# Patient Record
Sex: FEMALE | Race: WHITE | Employment: OTHER | URBAN - METROPOLITAN AREA
[De-identification: names, ages, dates, MRNs, and addresses within clinical notes are randomized per-mention and may not be internally consistent; named-entity substitution may affect disease eponyms.]

---

## 2022-04-12 ENCOUNTER — HOSPITAL ENCOUNTER (OUTPATIENT)
Dept: CARDIAC REHAB | Age: 72
Setting detail: THERAPIES SERIES
Discharge: HOME | End: 2022-04-12
Payer: MEDICARE

## 2022-04-12 PROCEDURE — 94618 PULMONARY STRESS TESTING: CPT

## 2022-04-12 NOTE — PROCEDURES
[facility] Cardiopulmonary Rehabilitation   Six Minute Walk Test    Unknown Ranjeet Soares  YOB: 1950  Account Number: [de-identified]  AGE: 66 y.o.     OP test []   Pulmonary Rehab PRE [x]  POST   []    Diagnosis: Interstitial lung disease      Referring Physician: Jazmyne    Gender []  male [x] female AGE:71     Height:5 ft 8 in 173 cm    Weight:278 lbs  126 kg  Blood Pressure 120/72    Medications affecting heart rate:  Tenormin 50 mg daily,  Brio inhaler daily      Supplemental O2 during the test [x]  no [] yes    [] continuous []  intermittent    Device : [] NC []  HFNC    []  oximizer  [] mask      Laps completed: 10 (1 lap = 100 ft)        Baseline (resting) Walking End of Test (recovery)      Heart Rate 87   127  88    BP  120/72           144/84           130/72    Dyspnea 0   0.5  0 (Sade scale)    Fatigue 0   2  2 (Sade scale)    SpO2  98%   92%             100%        Stopped or paused before 6 mins? [x]  no [] yes How long? Symptoms at end of exercise:[] angina  [] dizziness  []  hip, leg, calf, back pain       [x]  no complaints []  other    Number of laps 10 (x 30.48 meters) =    Total distance walked in 6 mins: 305 meters    Predicted distance: 305 meters  Percent predicted:94.9%              [x] normal       [] reduced     Summary: This is a pre-rehab test, performed on room air. The patient ambulated 305 meters which is normal-95% predicted. Her  heart rate response was normal, the blood pressure response was normal, oxygen saturations were normal.  The patient desaturated to 92% while ambulating on room air   The degree of symptoms based on the Sade Dyspnea/Fatigue scale were not significantly changed with testing. This test does not indicate the need for supplemental oxygen.           Shayla Snell, DO  Pulmonary Rehab Director
Patient

## 2022-04-13 PROCEDURE — 94618 PULMONARY STRESS TESTING: CPT | Performed by: INTERNAL MEDICINE

## 2022-04-13 NOTE — PROGRESS NOTES
[facility] PULMONARY REHABILITATION ORDER  Medical Director:  Dr. Quinton Duran  (X)Phase II Pulmonary Rehabilitation [facility] Facility-based, supervised exercise with SpO2 / HR monitoring and Oxygen Titration (if necessary) each session, 2-3 times weekly, with individualized education sessions. Patient Name: Sandra Gomez  : 1950  Referring Physician: Dr. Rea Flow  Date: 2022    Medically Necessary Pulmonary Rehab for:  ILD (from my dictation or the PFT report), which is GOLD Stage     Physician Prescribed Exercise:  Length of program:  Up to 36 sessions, subject to insurance limitations. Program to include aerobic endurance conditioning, resistance training (RT), step training (ST), flexibility training, and education (all relevant topics including psychosocial assessment). FITT Principles + Progression for Exercise Prescription (also found on the ITP):     Frequency: 2-3x / wk for up to 36 sessions    Intensity:   Set from Initial 6MWT (Feet achieved converted to METs)   Type:          Aerobic and Strength (Treadmill, AD, NuStep, SciFit, UBE, RT, ST)   Time:        15-60 min. of Treatment; Aerobic, RT, ST, Rests and Education  Progression:  1-2 minute increase in Time, per Type, per session, 5-20% increase in Intensity per week if SpO2 >88 AND Sade RPE/RPD is <14      Note:  These are guidelines. The Pulmonary Rehab staff may adjust the treatment to suit the patient's individual needs, goals, oxygen saturation and functional level. Plan of Care:  Pulmonary Rehab aerobic endurance and strength training sessions for a total of 30-91 min/day, including Education time, 2-3 days/week with suggested supplemented matching minutes of walking at home on most days not participating in Pulmonary Rehab. Patient is willing to cooperate and participate in the plan of care.  Patient is physically able, motivated, and willing to participate in [place], and I expect this patient to improve in a reasonable and predictable time frame. Other Program Components:   (X)6 Minute Walk Test (6 MWT) at program initiation and discharge   (X)Evaluation for oxygen needs while exercising   (X)Titrate Oxygen to maintain >88 SpO2   (X)Stop Exercise or Apply Oxygen if patient desaturates <88%   (x)May continue in the Maintenance Program upon completion    Measurable Endurance Goal:    -Aerobic endurance goal to be measured in minutes. Start endurance training per patient tolerance at 1-15 minutes per exercise type, progressing to a total of 31-60 minutes using various modes of training (see Exercise Prescription on Individualized Treatment Plan 'ITP'). -Measurable Muscular Strength Goal: Starting at 1-3 lbs x 8 reps, progressing daily/weekly to 5-10 lbs x 15 reps    NOTE: Leif Soares's tobacco History:   Social History     Tobacco Use   Smoking Status Former Smoker    Quit date: 1983    Years since quittin.3   Smokeless Tobacco Never Used     If patient is a current smoker, patient will participate in tobacco cessation program during Pulmonary Rehab.       Physician Signature/Date/Time:

## 2022-04-14 ENCOUNTER — HOSPITAL ENCOUNTER (OUTPATIENT)
Dept: CARDIAC REHAB | Age: 72
Setting detail: THERAPIES SERIES
Discharge: HOME | End: 2022-04-14
Payer: MEDICARE

## 2022-04-14 PROCEDURE — G0237 THERAPEUTIC PROCD STRG ENDUR: HCPCS

## 2022-04-14 RX ORDER — ATORVASTATIN CALCIUM 10 MG/1
10 TABLET, FILM COATED ORAL DAILY
COMMUNITY

## 2022-04-14 RX ORDER — DIMENHYDRINATE 50 MG/1
50 TABLET ORAL NIGHTLY PRN
COMMUNITY
End: 2022-06-27

## 2022-04-14 RX ORDER — LOSARTAN POTASSIUM 25 MG/1
25 TABLET ORAL DAILY
COMMUNITY

## 2022-04-14 RX ORDER — FLUTICASONE PROPIONATE 50 MCG
1 SPRAY, SUSPENSION (ML) NASAL DAILY
COMMUNITY
End: 2022-06-27 | Stop reason: ALTCHOICE

## 2022-04-14 RX ORDER — IPRATROPIUM BROMIDE AND ALBUTEROL SULFATE 2.5; .5 MG/3ML; MG/3ML
1 SOLUTION RESPIRATORY (INHALATION) EVERY 6 HOURS PRN
COMMUNITY

## 2022-04-19 ENCOUNTER — HOSPITAL ENCOUNTER (OUTPATIENT)
Dept: CARDIAC REHAB | Age: 72
Setting detail: THERAPIES SERIES
Discharge: HOME | End: 2022-04-19
Payer: MEDICARE

## 2022-04-19 ENCOUNTER — HOSPITAL ENCOUNTER (OUTPATIENT)
Dept: CARDIAC REHAB | Age: 72
Setting detail: THERAPIES SERIES
End: 2022-04-19
Payer: MEDICARE

## 2022-04-19 PROCEDURE — G0239 OTH RESP PROC, GROUP: HCPCS

## 2022-04-21 ENCOUNTER — HOSPITAL ENCOUNTER (OUTPATIENT)
Dept: CARDIAC REHAB | Age: 72
Setting detail: THERAPIES SERIES
Discharge: HOME | End: 2022-04-21
Payer: MEDICARE

## 2022-04-21 ENCOUNTER — APPOINTMENT (OUTPATIENT)
Dept: CARDIAC REHAB | Age: 72
End: 2022-04-21
Payer: MEDICARE

## 2022-04-21 PROCEDURE — G0239 OTH RESP PROC, GROUP: HCPCS

## 2022-04-26 ENCOUNTER — APPOINTMENT (OUTPATIENT)
Dept: CARDIAC REHAB | Age: 72
End: 2022-04-26
Payer: MEDICARE

## 2022-04-28 ENCOUNTER — APPOINTMENT (OUTPATIENT)
Dept: CARDIAC REHAB | Age: 72
End: 2022-04-28
Payer: MEDICARE

## 2022-04-28 LAB
GLUCOSE BLD-MCNC: 120 MG/DL (ref 70–99)
GLUCOSE BLD-MCNC: 127 MG/DL (ref 70–99)
PERFORMED ON: ABNORMAL
PERFORMED ON: ABNORMAL

## 2022-05-03 ENCOUNTER — HOSPITAL ENCOUNTER (OUTPATIENT)
Dept: CARDIAC REHAB | Age: 72
Setting detail: THERAPIES SERIES
Discharge: HOME | End: 2022-05-03
Payer: MEDICARE

## 2022-05-03 LAB
GLUCOSE BLD-MCNC: 112 MG/DL (ref 70–99)
GLUCOSE BLD-MCNC: 115 MG/DL (ref 70–99)
PERFORMED ON: ABNORMAL
PERFORMED ON: ABNORMAL

## 2022-05-03 PROCEDURE — G0239 OTH RESP PROC, GROUP: HCPCS

## 2022-05-05 ENCOUNTER — HOSPITAL ENCOUNTER (OUTPATIENT)
Dept: CARDIAC REHAB | Age: 72
Setting detail: THERAPIES SERIES
Discharge: HOME | End: 2022-05-05
Payer: MEDICARE

## 2022-05-05 PROCEDURE — G0239 OTH RESP PROC, GROUP: HCPCS

## 2022-05-10 ENCOUNTER — HOSPITAL ENCOUNTER (OUTPATIENT)
Dept: CARDIAC REHAB | Age: 72
Setting detail: THERAPIES SERIES
Discharge: HOME | End: 2022-05-10
Payer: MEDICARE

## 2022-05-10 PROCEDURE — G0239 OTH RESP PROC, GROUP: HCPCS

## 2022-05-12 ENCOUNTER — APPOINTMENT (OUTPATIENT)
Dept: CARDIAC REHAB | Age: 72
End: 2022-05-12
Payer: MEDICARE

## 2022-05-17 ENCOUNTER — HOSPITAL ENCOUNTER (OUTPATIENT)
Dept: CARDIAC REHAB | Age: 72
Setting detail: THERAPIES SERIES
Discharge: HOME | End: 2022-05-17
Payer: MEDICARE

## 2022-05-17 PROCEDURE — G0239 OTH RESP PROC, GROUP: HCPCS

## 2022-05-19 ENCOUNTER — HOSPITAL ENCOUNTER (OUTPATIENT)
Dept: CARDIAC REHAB | Age: 72
Setting detail: THERAPIES SERIES
Discharge: HOME | End: 2022-05-19
Payer: MEDICARE

## 2022-05-19 PROCEDURE — G0239 OTH RESP PROC, GROUP: HCPCS

## 2022-05-24 ENCOUNTER — HOSPITAL ENCOUNTER (OUTPATIENT)
Dept: CARDIAC REHAB | Age: 72
Setting detail: THERAPIES SERIES
Discharge: HOME | End: 2022-05-24
Payer: MEDICARE

## 2022-05-24 PROCEDURE — G0239 OTH RESP PROC, GROUP: HCPCS

## 2022-05-26 ENCOUNTER — APPOINTMENT (OUTPATIENT)
Dept: CARDIAC REHAB | Age: 72
End: 2022-05-26
Payer: MEDICARE

## 2022-05-31 ENCOUNTER — HOSPITAL ENCOUNTER (OUTPATIENT)
Dept: CARDIAC REHAB | Age: 72
Setting detail: THERAPIES SERIES
Discharge: HOME | End: 2022-05-31
Payer: MEDICARE

## 2022-05-31 PROCEDURE — G0239 OTH RESP PROC, GROUP: HCPCS

## 2022-06-02 ENCOUNTER — HOSPITAL ENCOUNTER (OUTPATIENT)
Dept: CARDIAC REHAB | Age: 72
Setting detail: THERAPIES SERIES
Discharge: HOME | End: 2022-06-02
Payer: MEDICARE

## 2022-06-02 PROCEDURE — G0239 OTH RESP PROC, GROUP: HCPCS

## 2022-06-07 ENCOUNTER — APPOINTMENT (OUTPATIENT)
Dept: CARDIAC REHAB | Age: 72
End: 2022-06-07
Payer: MEDICARE

## 2022-06-09 ENCOUNTER — APPOINTMENT (OUTPATIENT)
Dept: CARDIAC REHAB | Age: 72
End: 2022-06-09
Payer: MEDICARE

## 2022-06-14 ENCOUNTER — APPOINTMENT (OUTPATIENT)
Dept: CARDIAC REHAB | Age: 72
End: 2022-06-14
Payer: MEDICARE

## 2022-06-16 ENCOUNTER — APPOINTMENT (OUTPATIENT)
Dept: CARDIAC REHAB | Age: 72
End: 2022-06-16
Payer: MEDICARE

## 2022-06-20 ENCOUNTER — TELEPHONE (OUTPATIENT)
Dept: ORTHOPEDIC SURGERY | Age: 72
End: 2022-06-20

## 2022-06-21 ENCOUNTER — APPOINTMENT (OUTPATIENT)
Dept: CARDIAC REHAB | Age: 72
End: 2022-06-21
Payer: MEDICARE

## 2022-06-23 ENCOUNTER — APPOINTMENT (OUTPATIENT)
Dept: CARDIAC REHAB | Age: 72
End: 2022-06-23
Payer: MEDICARE

## 2022-06-27 ENCOUNTER — OFFICE VISIT (OUTPATIENT)
Dept: ORTHOPEDIC SURGERY | Age: 72
End: 2022-06-27
Payer: MEDICARE

## 2022-06-27 VITALS — HEIGHT: 68 IN | WEIGHT: 279 LBS | BODY MASS INDEX: 42.28 KG/M2

## 2022-06-27 DIAGNOSIS — D49.2 NEOPLASM OF RIGHT FEMUR: ICD-10-CM

## 2022-06-27 DIAGNOSIS — M25.561 CHRONIC PAIN OF RIGHT KNEE: Primary | ICD-10-CM

## 2022-06-27 DIAGNOSIS — G89.29 CHRONIC PAIN OF RIGHT KNEE: Primary | ICD-10-CM

## 2022-06-27 PROCEDURE — G8400 PT W/DXA NO RESULTS DOC: HCPCS | Performed by: ORTHOPAEDIC SURGERY

## 2022-06-27 PROCEDURE — G8419 CALC BMI OUT NRM PARAM NOF/U: HCPCS | Performed by: ORTHOPAEDIC SURGERY

## 2022-06-27 PROCEDURE — G8427 DOCREV CUR MEDS BY ELIG CLIN: HCPCS | Performed by: ORTHOPAEDIC SURGERY

## 2022-06-27 PROCEDURE — 1123F ACP DISCUSS/DSCN MKR DOCD: CPT | Performed by: ORTHOPAEDIC SURGERY

## 2022-06-27 PROCEDURE — 3017F COLORECTAL CA SCREEN DOC REV: CPT | Performed by: ORTHOPAEDIC SURGERY

## 2022-06-27 PROCEDURE — 1036F TOBACCO NON-USER: CPT | Performed by: ORTHOPAEDIC SURGERY

## 2022-06-27 PROCEDURE — 1090F PRES/ABSN URINE INCON ASSESS: CPT | Performed by: ORTHOPAEDIC SURGERY

## 2022-06-27 PROCEDURE — 99203 OFFICE O/P NEW LOW 30 MIN: CPT | Performed by: ORTHOPAEDIC SURGERY

## 2022-06-27 RX ORDER — PREDNISONE 20 MG/1
TABLET ORAL
COMMUNITY
Start: 2022-03-22 | End: 2022-06-27 | Stop reason: ALTCHOICE

## 2022-06-27 RX ORDER — METOPROLOL TARTRATE 50 MG/1
50 TABLET, FILM COATED ORAL DAILY
COMMUNITY

## 2022-06-27 RX ORDER — COVID-19 MOLECULAR TEST ASSAY
KIT MISCELLANEOUS
COMMUNITY
Start: 2022-05-25 | End: 2022-06-27

## 2022-06-27 RX ORDER — SULFAMETHOXAZOLE AND TRIMETHOPRIM 800; 160 MG/1; MG/1
TABLET ORAL
COMMUNITY
Start: 2022-05-18 | End: 2022-06-27 | Stop reason: ALTCHOICE

## 2022-06-27 RX ORDER — ALBUTEROL SULFATE 90 UG/1
1 AEROSOL, METERED RESPIRATORY (INHALATION) EVERY 4 HOURS PRN
COMMUNITY
Start: 2021-12-09

## 2022-06-27 RX ORDER — LIDOCAINE 50 MG/G
PATCH TOPICAL
COMMUNITY
Start: 2022-06-14 | End: 2022-06-27

## 2022-06-27 RX ORDER — METOPROLOL SUCCINATE 50 MG/1
TABLET, EXTENDED RELEASE ORAL
COMMUNITY
Start: 2021-11-24 | End: 2022-06-27 | Stop reason: ALTCHOICE

## 2022-06-27 NOTE — PROGRESS NOTES
Schuyler Smithding seen today for evaluation of right knee pain. She injured her right knee on vacation in March of this year. She had x-rays and was given prednisone which did not help. She has medial pain. She uses CBD with THC cream and says that is helpful. She is frustrated because she says her knee feels \"unstable\". She has had 2 meniscectomies on the left knee in 2009 2010. Past history significant for type 2 diabetes and high blood pressure. History: Patient's relevant past family, medical, and social history are reviewed as part of today's visit. ROS of pertinent positives and negatives as above; otherwise negative. General Exam:    Vitals: Height 5' 8\" (1.727 m), weight 279 lb (126.6 kg). Constitutional: Patient is adequately groomed with no evidence of malnutrition  Mental Status: The patient is oriented to time, place and person. The patient's mood and affect are appropriate. Gait:  Patient walks with normal gait and station. Lymphatic: The lymphatic examination bilaterally reveals all areas to be without enlargement or induration. Vascular: Examination reveals no swelling or calf tenderness. Peripheral pulses are palpable and 2+. Neurological: The patient has good coordination. There is no weakness or sensory deficit. Skin:    Head/Neck: inspection reveals no rashes, ulcerations or lesions. Trunk:  inspection reveals no rashes, ulcerations or lesions. Right Lower Extremity: inspection reveals no rashes, ulcerations or lesions. Left Lower Extremity: inspection reveals no rashes, ulcerations or lesions. She is morbidly obese. Left knee has no joint line pain. She has mild crepitation. Range of motion 0 to 100 degrees limited by body habitus. Right knee has severe patellofemoral crepitation with mild medial joint line pain range of motion 0 to 110 degrees limited by body habitus. She is grossly stable. Calf is soft.         X-rays were obtained today in the office and interpreted by me. AP standing, PA flexed, and merchant views of the bilateral knees as well as a lateral of the right knee. These demonstrate: Irregularity in the bone marrow of the distal femur. This was noted by me on her x-rays from urgent care although not commented on by the radiologist.    No acute findings are noted at the right knee joint without significant arthritic change but there is patellofemoral narrowing. Left knee has bone-on-bone medial and patellofemoral arthritis. Right femur x-rays obtained today in the office AP lateral views demonstrate what is probably an enchondroma in the distal femoral metaphysis. 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 Plan: She needs an MRI scan of her right femur performed before I am comfortable proceeding with further intervention. We will likely proceed with a cortisone injection as long as the MRI is benign.

## 2022-06-28 ENCOUNTER — APPOINTMENT (OUTPATIENT)
Dept: CARDIAC REHAB | Age: 72
End: 2022-06-28
Payer: MEDICARE

## 2022-06-28 ENCOUNTER — OFFICE VISIT (OUTPATIENT)
Dept: ORTHOPEDIC SURGERY | Age: 72
End: 2022-06-28
Payer: MEDICARE

## 2022-06-28 VITALS — BODY MASS INDEX: 42.28 KG/M2 | HEIGHT: 68 IN | WEIGHT: 279 LBS

## 2022-06-28 DIAGNOSIS — D16.21 ENCHONDROMA OF FEMUR, RIGHT: ICD-10-CM

## 2022-06-28 DIAGNOSIS — M17.11 ARTHRITIS OF RIGHT KNEE: ICD-10-CM

## 2022-06-28 DIAGNOSIS — M25.561 CHRONIC PAIN OF RIGHT KNEE: Primary | ICD-10-CM

## 2022-06-28 DIAGNOSIS — G89.29 CHRONIC PAIN OF LEFT KNEE: ICD-10-CM

## 2022-06-28 DIAGNOSIS — M17.12 ARTHRITIS OF LEFT KNEE: ICD-10-CM

## 2022-06-28 DIAGNOSIS — G89.29 CHRONIC PAIN OF RIGHT KNEE: Primary | ICD-10-CM

## 2022-06-28 DIAGNOSIS — M25.562 CHRONIC PAIN OF LEFT KNEE: ICD-10-CM

## 2022-06-28 PROCEDURE — 20610 DRAIN/INJ JOINT/BURSA W/O US: CPT | Performed by: ORTHOPAEDIC SURGERY

## 2022-06-28 RX ORDER — METHYLPREDNISOLONE ACETATE 40 MG/ML
80 INJECTION, SUSPENSION INTRA-ARTICULAR; INTRALESIONAL; INTRAMUSCULAR; SOFT TISSUE ONCE
Status: COMPLETED | OUTPATIENT
Start: 2022-06-28 | End: 2022-06-28

## 2022-06-28 RX ORDER — LIDOCAINE HYDROCHLORIDE 10 MG/ML
4 INJECTION, SOLUTION INFILTRATION; PERINEURAL ONCE
Status: COMPLETED | OUTPATIENT
Start: 2022-06-28 | End: 2022-06-28

## 2022-06-28 RX ADMIN — LIDOCAINE HYDROCHLORIDE 4 ML: 10 INJECTION, SOLUTION INFILTRATION; PERINEURAL at 15:21

## 2022-06-28 RX ADMIN — LIDOCAINE HYDROCHLORIDE 4 ML: 10 INJECTION, SOLUTION INFILTRATION; PERINEURAL at 15:20

## 2022-06-28 RX ADMIN — METHYLPREDNISOLONE ACETATE 80 MG: 40 INJECTION, SUSPENSION INTRA-ARTICULAR; INTRALESIONAL; INTRAMUSCULAR; SOFT TISSUE at 15:21

## 2022-06-28 NOTE — PROGRESS NOTES
Shila Moser returns today complaining of bilateral knee pain. Pain is at least 2 out of 10 bilaterally. We also obtained her MRI scan because of the irregularity noted on her x-rays. General Exam:    Vitals: Height 5' 8\" (1.727 m), weight 279 lb (126.6 kg). Constitutional: Patient is adequately groomed with no evidence of malnutrition  Mental Status: The patient is oriented to time, place and person. The patient's mood and affect are appropriate. Left knee has no joint line pain. She has mild crepitation. Range of motion 0 to 100 degrees limited by body habitus.     Right knee has severe patellofemoral crepitation with mild medial joint line pain range of motion 0 to 110 degrees limited by body habitus. She is grossly stable. Calf is soft. Right femur MRI is reviewed. It demonstrates:    Exam Date: 06/27/2022   Exam Description: MR Right Femur w/o Contrast            HISTORY:  77year-old female with neoplasm of the femur.  Evaluate right femur.  Abnormal    x-ray.  Ongoing medial knee pain and instability.  Abnormal x-rays attached.  No prior surgery,    no history of cancer, and no prior MRI examination.       TECHNICAL FACTORS:  Long- and short-axis fat- and water-weighted images were performed.       COMPARISON:  Radiographs of the right knee and right femur dated June 27, 2022 are available    for comparison.       FINDINGS:  Intramedullary signal alteration is present within the distal femoral shaft    extending up to approximately 6-7 cm in greatest length (coronal T1 and T2 fat-sat series 9 and    8, images 17 and 18 and axial T1, T2 and STIR series 12, 11, and 13, images 39 through 45).      In this patient with intramedullary sclerosis in this region on radiographs, this is strongly    favored to represent a slightly atypical appearance of an enchondroma or bone infarct, although    the etiology cannot be stated with certainty.       Demonstrated only on the coronal T2 fat-sat sequence which includes both femurs is    intramedullary signal alteration within the contralateral left distal femoral shaft at    approximately the same location (coronal T2 fat-sat series 8, image 18) which is not fully    evaluated/characterized on this right leg MRI examination.  Subtle sclerosis is believed to be    present at this location on the patient's radiographs.       There is no evidence of fracture or stress fracture within the right femur.       Mild multifocal fatty infiltration and atrophy of the musculature of the right thigh is    present. Alessandra Seller is no evidence of acute muscular strain.       There is no evidence of soft tissue mass.                                               CONCLUSION:   1. Intramedullary signal alteration extending up to 6-7 cm in greatest diameter is present    within the distal right femoral shaft.  In this patient who has intramedullary sclerosis on    radiographs in this proximity, this is strongly favored to represent a slightly atypical    appearance of either an enchondroma or a bone infarct, although the etiology cannot be stated    with certainty. 2. Demonstrated only on the coronal T2 fat-sat sequence which includes both femurs (and not    fully evaluated on this right femur MRI examination) is intramedullary signal alteration at a    similar location within the contralateral left distal femoral shaft (and subtle intramedullary    sclerosis is believed to be present at this location on the patient's radiographs). 3. Mild fatty infiltration and atrophy of the musculature of the right thigh. I reviewed these findings and the report and the images with the patient and her . I also reviewed the scan. I am in agreement that this appears to be a benign enchondroma. Assessment: Benign enchondroma right femur, bilateral knee arthritis    Plan: Bilateral cortisone injections. We discussed the risk, benefits, and alternatives to this intervention. She wants to proceed. Procedure: Under sterile technique the right knee was injected into the anterolateral arthroscopy portal with 80 mg of Depo-Medrol 40 mg of lidocaine. Procedure: Under sterile technique the left knee was injected into the anterolateral thoracically portal with 80 mg of Depo-Medrol and 40 mg of lidocaine. She tolerated both shots well.   Follow-up with me on an as-needed basis peer

## 2022-06-30 ENCOUNTER — APPOINTMENT (OUTPATIENT)
Dept: CARDIAC REHAB | Age: 72
End: 2022-06-30
Payer: MEDICARE

## 2022-07-05 ENCOUNTER — APPOINTMENT (OUTPATIENT)
Dept: CARDIAC REHAB | Age: 72
End: 2022-07-05
Payer: MEDICARE

## 2022-07-07 ENCOUNTER — APPOINTMENT (OUTPATIENT)
Dept: CARDIAC REHAB | Age: 72
End: 2022-07-07
Payer: MEDICARE

## 2022-07-12 ENCOUNTER — APPOINTMENT (OUTPATIENT)
Dept: CARDIAC REHAB | Age: 72
End: 2022-07-12
Payer: MEDICARE

## 2022-07-14 ENCOUNTER — APPOINTMENT (OUTPATIENT)
Dept: CARDIAC REHAB | Age: 72
End: 2022-07-14
Payer: MEDICARE

## 2022-07-19 ENCOUNTER — APPOINTMENT (OUTPATIENT)
Dept: CARDIAC REHAB | Age: 72
End: 2022-07-19
Payer: MEDICARE

## 2022-07-21 ENCOUNTER — APPOINTMENT (OUTPATIENT)
Dept: CARDIAC REHAB | Age: 72
End: 2022-07-21
Payer: MEDICARE

## 2022-07-26 ENCOUNTER — APPOINTMENT (OUTPATIENT)
Dept: CARDIAC REHAB | Age: 72
End: 2022-07-26
Payer: MEDICARE

## 2022-07-28 ENCOUNTER — APPOINTMENT (OUTPATIENT)
Dept: CARDIAC REHAB | Age: 72
End: 2022-07-28
Payer: MEDICARE

## 2022-08-02 ENCOUNTER — APPOINTMENT (OUTPATIENT)
Dept: CARDIAC REHAB | Age: 72
End: 2022-08-02
Payer: MEDICARE

## 2022-08-04 ENCOUNTER — APPOINTMENT (OUTPATIENT)
Dept: CARDIAC REHAB | Age: 72
End: 2022-08-04
Payer: MEDICARE

## 2022-08-09 ENCOUNTER — APPOINTMENT (OUTPATIENT)
Dept: CARDIAC REHAB | Age: 72
End: 2022-08-09
Payer: MEDICARE

## 2022-08-11 ENCOUNTER — APPOINTMENT (OUTPATIENT)
Dept: CARDIAC REHAB | Age: 72
End: 2022-08-11
Payer: MEDICARE

## 2022-08-16 ENCOUNTER — APPOINTMENT (OUTPATIENT)
Dept: CARDIAC REHAB | Age: 72
End: 2022-08-16
Payer: MEDICARE

## 2022-08-18 ENCOUNTER — APPOINTMENT (OUTPATIENT)
Dept: CARDIAC REHAB | Age: 72
End: 2022-08-18
Payer: MEDICARE

## 2022-08-23 ENCOUNTER — APPOINTMENT (OUTPATIENT)
Dept: CARDIAC REHAB | Age: 72
End: 2022-08-23
Payer: MEDICARE

## 2022-08-25 ENCOUNTER — APPOINTMENT (OUTPATIENT)
Dept: CARDIAC REHAB | Age: 72
End: 2022-08-25
Payer: MEDICARE